# Patient Record
Sex: FEMALE | Race: WHITE | NOT HISPANIC OR LATINO | URBAN - METROPOLITAN AREA
[De-identification: names, ages, dates, MRNs, and addresses within clinical notes are randomized per-mention and may not be internally consistent; named-entity substitution may affect disease eponyms.]

---

## 2019-05-30 ENCOUNTER — OUTPATIENT (OUTPATIENT)
Dept: OUTPATIENT SERVICES | Facility: HOSPITAL | Age: 34
LOS: 1 days | End: 2019-05-30
Payer: COMMERCIAL

## 2019-05-30 VITALS
HEIGHT: 64 IN | SYSTOLIC BLOOD PRESSURE: 125 MMHG | HEART RATE: 67 BPM | OXYGEN SATURATION: 100 % | TEMPERATURE: 99 F | RESPIRATION RATE: 15 BRPM | DIASTOLIC BLOOD PRESSURE: 75 MMHG | WEIGHT: 145.95 LBS

## 2019-05-30 DIAGNOSIS — O26.872 CERVICAL SHORTENING, SECOND TRIMESTER: ICD-10-CM

## 2019-05-30 DIAGNOSIS — Z98.890 OTHER SPECIFIED POSTPROCEDURAL STATES: Chronic | ICD-10-CM

## 2019-05-30 DIAGNOSIS — Z01.818 ENCOUNTER FOR OTHER PREPROCEDURAL EXAMINATION: ICD-10-CM

## 2019-05-30 LAB
HCT VFR BLD CALC: 36.8 % — SIGNIFICANT CHANGE UP (ref 34.5–45)
HGB BLD-MCNC: 12.9 G/DL — SIGNIFICANT CHANGE UP (ref 11.5–15.5)
MCHC RBC-ENTMCNC: 31.5 PG — SIGNIFICANT CHANGE UP (ref 27–34)
MCHC RBC-ENTMCNC: 35.1 GM/DL — SIGNIFICANT CHANGE UP (ref 32–36)
MCV RBC AUTO: 89.8 FL — SIGNIFICANT CHANGE UP (ref 80–100)
PLATELET # BLD AUTO: 223 K/UL — SIGNIFICANT CHANGE UP (ref 150–400)
RBC # BLD: 4.1 M/UL — SIGNIFICANT CHANGE UP (ref 3.8–5.2)
RBC # FLD: 12.3 % — SIGNIFICANT CHANGE UP (ref 10.3–14.5)
WBC # BLD: 9.23 K/UL — SIGNIFICANT CHANGE UP (ref 3.8–10.5)
WBC # FLD AUTO: 9.23 K/UL — SIGNIFICANT CHANGE UP (ref 3.8–10.5)

## 2019-05-30 PROCEDURE — 85027 COMPLETE CBC AUTOMATED: CPT

## 2019-05-30 PROCEDURE — G0463: CPT

## 2019-05-30 RX ORDER — LIDOCAINE HCL 20 MG/ML
0.2 VIAL (ML) INJECTION ONCE
Refills: 0 | Status: DISCONTINUED | OUTPATIENT
Start: 2019-06-06 | End: 2019-06-21

## 2019-05-30 RX ORDER — SODIUM CHLORIDE 9 MG/ML
3 INJECTION INTRAMUSCULAR; INTRAVENOUS; SUBCUTANEOUS EVERY 8 HOURS
Refills: 0 | Status: DISCONTINUED | OUTPATIENT
Start: 2019-06-06 | End: 2019-06-21

## 2019-05-30 NOTE — H&P PST ADULT - NSICDXPROBLEM_GEN_ALL_CORE_FT
PROBLEM DIAGNOSES  Problem: Cervical shortening in second trimester  Assessment and Plan: Cervical cerclage , PSt instruction given , CBC drawn sent pending result

## 2019-05-30 NOTE — H&P PST ADULT - HISTORY OF PRESENT ILLNESS
34 year old female 13 weeks pregnant came in for PSt today for cervical cerclage . Patient had previous hx of incompetent cervix from previous pregnancy , doing this for prophylaxis procedure.

## 2019-05-30 NOTE — H&P PST ADULT - NSANTHOSAYNRD_GEN_A_CORE
No. THAI screening performed.  STOP BANG Legend: 0-2 = LOW Risk; 3-4 = INTERMEDIATE Risk; 5-8 = HIGH Risk

## 2019-06-05 ENCOUNTER — TRANSCRIPTION ENCOUNTER (OUTPATIENT)
Age: 34
End: 2019-06-05

## 2019-06-06 ENCOUNTER — OUTPATIENT (OUTPATIENT)
Dept: OUTPATIENT SERVICES | Facility: HOSPITAL | Age: 34
LOS: 1 days | End: 2019-06-06
Payer: COMMERCIAL

## 2019-06-06 VITALS
TEMPERATURE: 98 F | HEART RATE: 87 BPM | SYSTOLIC BLOOD PRESSURE: 104 MMHG | DIASTOLIC BLOOD PRESSURE: 68 MMHG | HEIGHT: 64 IN | OXYGEN SATURATION: 98 % | RESPIRATION RATE: 16 BRPM | WEIGHT: 145.95 LBS

## 2019-06-06 VITALS
SYSTOLIC BLOOD PRESSURE: 116 MMHG | HEART RATE: 82 BPM | OXYGEN SATURATION: 98 % | DIASTOLIC BLOOD PRESSURE: 62 MMHG | TEMPERATURE: 97 F | RESPIRATION RATE: 18 BRPM

## 2019-06-06 DIAGNOSIS — O26.872 CERVICAL SHORTENING, SECOND TRIMESTER: ICD-10-CM

## 2019-06-06 DIAGNOSIS — Z98.890 OTHER SPECIFIED POSTPROCEDURAL STATES: Chronic | ICD-10-CM

## 2019-06-06 PROCEDURE — 59320 REVISION OF CERVIX: CPT

## 2019-06-06 RX ORDER — ACETAMINOPHEN 500 MG
1000 TABLET ORAL ONCE
Refills: 0 | Status: COMPLETED | OUTPATIENT
Start: 2019-06-06 | End: 2019-06-06

## 2019-06-06 RX ORDER — SODIUM CHLORIDE 9 MG/ML
1000 INJECTION, SOLUTION INTRAVENOUS
Refills: 0 | Status: DISCONTINUED | OUTPATIENT
Start: 2019-06-06 | End: 2019-06-21

## 2019-06-06 RX ORDER — ONDANSETRON 8 MG/1
4 TABLET, FILM COATED ORAL ONCE
Refills: 0 | Status: DISCONTINUED | OUTPATIENT
Start: 2019-06-06 | End: 2019-06-21

## 2019-06-06 RX ADMIN — Medication 400 MILLIGRAM(S): at 08:15

## 2019-06-06 NOTE — ASU DISCHARGE PLAN (ADULT/PEDIATRIC) - CARE PROVIDER_API CALL
Aurelio Mercer)  Obstetrics and Gynecology  [address] [place] [id number]  Phone: [phone]  Fax: [phone]  Follow Up Time:

## 2019-06-06 NOTE — BRIEF OPERATIVE NOTE - NSICDXBRIEFPOSTOP_GEN_ALL_CORE_FT
POST-OP DIAGNOSIS:  Incompetent cervix in pregnancy, second trimester 06-Jun-2019 07:44:23  Linda Vasquez

## 2019-06-06 NOTE — BRIEF OPERATIVE NOTE - NSICDXBRIEFPREOP_GEN_ALL_CORE_FT
PRE-OP DIAGNOSIS:  Incompetent cervix in pregnancy, second trimester 06-Jun-2019 07:44:03  Linda Vasquez

## 2019-06-25 PROBLEM — Z78.9 OTHER SPECIFIED HEALTH STATUS: Chronic | Status: ACTIVE | Noted: 2019-05-30

## 2019-06-25 PROBLEM — Z00.00 ENCOUNTER FOR PREVENTIVE HEALTH EXAMINATION: Status: ACTIVE | Noted: 2019-06-25

## 2019-07-18 ENCOUNTER — ASOB RESULT (OUTPATIENT)
Age: 34
End: 2019-07-18

## 2019-07-18 ENCOUNTER — APPOINTMENT (OUTPATIENT)
Dept: ANTEPARTUM | Facility: CLINIC | Age: 34
End: 2019-07-18
Payer: COMMERCIAL

## 2019-07-18 PROCEDURE — 76811 OB US DETAILED SNGL FETUS: CPT

## 2019-07-18 PROCEDURE — 76817 TRANSVAGINAL US OBSTETRIC: CPT

## 2019-08-27 ENCOUNTER — ASOB RESULT (OUTPATIENT)
Age: 34
End: 2019-08-27

## 2019-08-27 ENCOUNTER — APPOINTMENT (OUTPATIENT)
Dept: MATERNAL FETAL MEDICINE | Facility: CLINIC | Age: 34
End: 2019-08-27
Payer: COMMERCIAL

## 2019-08-27 DIAGNOSIS — O99.810 ABNORMAL GLUCOSE COMPLICATING PREGNANCY: ICD-10-CM

## 2019-08-27 PROCEDURE — G0109 DIAB MANAGE TRN IND/GROUP: CPT

## 2019-08-28 ENCOUNTER — TRANSCRIPTION ENCOUNTER (OUTPATIENT)
Age: 34
End: 2019-08-28

## 2019-08-28 RX ORDER — BLOOD-GLUCOSE METER
KIT MISCELLANEOUS
Qty: 1 | Refills: 5 | Status: ACTIVE | COMMUNITY
Start: 2019-08-27 | End: 1900-01-01

## 2019-08-28 RX ORDER — LANCETS 33 GAUGE
EACH MISCELLANEOUS
Qty: 1 | Refills: 2 | Status: ACTIVE | COMMUNITY
Start: 2019-08-27 | End: 1900-01-01

## 2019-08-28 RX ORDER — BLOOD-GLUCOSE METER
W/DEVICE KIT MISCELLANEOUS
Qty: 1 | Refills: 0 | Status: ACTIVE | COMMUNITY
Start: 2019-08-27 | End: 1900-01-01

## 2019-08-28 RX ORDER — URINE ACETONE TEST STRIPS
STRIP MISCELLANEOUS
Qty: 1 | Refills: 1 | Status: ACTIVE | COMMUNITY
Start: 2019-08-27 | End: 1900-01-01

## 2019-09-04 ENCOUNTER — APPOINTMENT (OUTPATIENT)
Dept: MATERNAL FETAL MEDICINE | Facility: CLINIC | Age: 34
End: 2019-09-04
Payer: COMMERCIAL

## 2019-09-04 PROCEDURE — G0109 DIAB MANAGE TRN IND/GROUP: CPT

## 2019-09-10 ENCOUNTER — APPOINTMENT (OUTPATIENT)
Dept: MATERNAL FETAL MEDICINE | Facility: CLINIC | Age: 34
End: 2019-09-10
Payer: COMMERCIAL

## 2019-09-10 PROCEDURE — G0108 DIAB MANAGE TRN  PER INDIV: CPT

## 2019-09-24 ENCOUNTER — APPOINTMENT (OUTPATIENT)
Dept: MATERNAL FETAL MEDICINE | Facility: CLINIC | Age: 34
End: 2019-09-24
Payer: COMMERCIAL

## 2019-09-24 ENCOUNTER — ASOB RESULT (OUTPATIENT)
Age: 34
End: 2019-09-24

## 2019-09-24 PROCEDURE — G0108 DIAB MANAGE TRN  PER INDIV: CPT

## 2019-10-03 ENCOUNTER — ASOB RESULT (OUTPATIENT)
Age: 34
End: 2019-10-03

## 2019-10-03 ENCOUNTER — APPOINTMENT (OUTPATIENT)
Dept: MATERNAL FETAL MEDICINE | Facility: CLINIC | Age: 34
End: 2019-10-03
Payer: COMMERCIAL

## 2019-10-03 PROCEDURE — G0108 DIAB MANAGE TRN  PER INDIV: CPT

## 2019-10-03 RX ORDER — HUMAN INSULIN 100 [IU]/ML
100 INJECTION, SUSPENSION SUBCUTANEOUS AT BEDTIME
Qty: 1 | Refills: 2 | Status: ACTIVE | COMMUNITY
Start: 2019-10-03 | End: 1900-01-01

## 2019-10-10 ENCOUNTER — ASOB RESULT (OUTPATIENT)
Age: 34
End: 2019-10-10

## 2019-10-10 ENCOUNTER — APPOINTMENT (OUTPATIENT)
Dept: MATERNAL FETAL MEDICINE | Facility: CLINIC | Age: 34
End: 2019-10-10
Payer: COMMERCIAL

## 2019-10-10 PROCEDURE — G0108 DIAB MANAGE TRN  PER INDIV: CPT

## 2019-10-10 RX ORDER — PEN NEEDLE, DIABETIC 29 G X1/2"
32G X 4 MM NEEDLE, DISPOSABLE MISCELLANEOUS
Qty: 1 | Refills: 1 | Status: ACTIVE | COMMUNITY
Start: 2019-10-10 | End: 1900-01-01

## 2019-10-10 RX ORDER — INSULIN HUMAN 100 [IU]/ML
100 INJECTION, SUSPENSION SUBCUTANEOUS
Qty: 1 | Refills: 2 | Status: ACTIVE | COMMUNITY
Start: 2019-10-10 | End: 1900-01-01

## 2019-10-15 ENCOUNTER — MESSAGE (OUTPATIENT)
Age: 34
End: 2019-10-15

## 2019-10-16 ENCOUNTER — MESSAGE (OUTPATIENT)
Age: 34
End: 2019-10-16

## 2019-10-18 ENCOUNTER — RX RENEWAL (OUTPATIENT)
Age: 34
End: 2019-10-18

## 2019-10-21 RX ORDER — PEN NEEDLE, DIABETIC 29 G X1/2"
32G X 4 MM NEEDLE, DISPOSABLE MISCELLANEOUS
Qty: 1 | Refills: 1 | Status: ACTIVE | COMMUNITY
Start: 2019-10-03 | End: 1900-01-01

## 2019-10-24 ENCOUNTER — ASOB RESULT (OUTPATIENT)
Age: 34
End: 2019-10-24

## 2019-10-24 ENCOUNTER — APPOINTMENT (OUTPATIENT)
Dept: MATERNAL FETAL MEDICINE | Facility: CLINIC | Age: 34
End: 2019-10-24
Payer: COMMERCIAL

## 2019-10-24 PROCEDURE — G0108 DIAB MANAGE TRN  PER INDIV: CPT

## 2019-11-13 ENCOUNTER — RX RENEWAL (OUTPATIENT)
Age: 34
End: 2019-11-13

## 2019-11-13 RX ORDER — SYRING-NEEDL,DISP,INSUL,0.3 ML 31 GX5/16"
31G X 5/16" SYRINGE, EMPTY DISPOSABLE MISCELLANEOUS
Qty: 1 | Refills: 1 | Status: ACTIVE | COMMUNITY
Start: 2019-11-13 | End: 1900-01-01

## 2019-11-15 ENCOUNTER — MESSAGE (OUTPATIENT)
Age: 34
End: 2019-11-15

## 2019-11-15 ENCOUNTER — APPOINTMENT (OUTPATIENT)
Dept: MATERNAL FETAL MEDICINE | Facility: CLINIC | Age: 34
End: 2019-11-15

## 2019-11-21 ENCOUNTER — ASOB RESULT (OUTPATIENT)
Age: 34
End: 2019-11-21

## 2019-11-21 ENCOUNTER — APPOINTMENT (OUTPATIENT)
Dept: MATERNAL FETAL MEDICINE | Facility: CLINIC | Age: 34
End: 2019-11-21
Payer: COMMERCIAL

## 2019-11-21 PROCEDURE — G0108 DIAB MANAGE TRN  PER INDIV: CPT

## 2019-12-02 ENCOUNTER — INPATIENT (INPATIENT)
Facility: HOSPITAL | Age: 34
LOS: 1 days | Discharge: HOME | End: 2019-12-04
Attending: OBSTETRICS & GYNECOLOGY | Admitting: OBSTETRICS & GYNECOLOGY
Payer: COMMERCIAL

## 2019-12-02 VITALS
RESPIRATION RATE: 18 BRPM | DIASTOLIC BLOOD PRESSURE: 57 MMHG | HEART RATE: 78 BPM | TEMPERATURE: 99 F | SYSTOLIC BLOOD PRESSURE: 109 MMHG | WEIGHT: 175.05 LBS | OXYGEN SATURATION: 98 %

## 2019-12-02 DIAGNOSIS — Z3A.00 WEEKS OF GESTATION OF PREGNANCY NOT SPECIFIED: ICD-10-CM

## 2019-12-02 DIAGNOSIS — O26.899 OTHER SPECIFIED PREGNANCY RELATED CONDITIONS, UNSPECIFIED TRIMESTER: ICD-10-CM

## 2019-12-02 DIAGNOSIS — Z34.80 ENCOUNTER FOR SUPERVISION OF OTHER NORMAL PREGNANCY, UNSPECIFIED TRIMESTER: ICD-10-CM

## 2019-12-02 DIAGNOSIS — Z98.890 OTHER SPECIFIED POSTPROCEDURAL STATES: Chronic | ICD-10-CM

## 2019-12-02 LAB
BASOPHILS # BLD AUTO: 0.03 K/UL — SIGNIFICANT CHANGE UP (ref 0–0.2)
BASOPHILS NFR BLD AUTO: 0.3 % — SIGNIFICANT CHANGE UP (ref 0–2)
BLD GP AB SCN SERPL QL: NEGATIVE — SIGNIFICANT CHANGE UP
EOSINOPHIL # BLD AUTO: 0.1 K/UL — SIGNIFICANT CHANGE UP (ref 0–0.5)
EOSINOPHIL NFR BLD AUTO: 1 % — SIGNIFICANT CHANGE UP (ref 0–6)
GLUCOSE BLDC GLUCOMTR-MCNC: 107 MG/DL — HIGH (ref 70–99)
HCT VFR BLD CALC: 33.5 % — LOW (ref 34.5–45)
HGB BLD-MCNC: 11.6 G/DL — SIGNIFICANT CHANGE UP (ref 11.5–15.5)
IMM GRANULOCYTES NFR BLD AUTO: 0.5 % — SIGNIFICANT CHANGE UP (ref 0–1.5)
LYMPHOCYTES # BLD AUTO: 2.36 K/UL — SIGNIFICANT CHANGE UP (ref 1–3.3)
LYMPHOCYTES # BLD AUTO: 22.5 % — SIGNIFICANT CHANGE UP (ref 13–44)
MCHC RBC-ENTMCNC: 31.2 PG — SIGNIFICANT CHANGE UP (ref 27–34)
MCHC RBC-ENTMCNC: 34.6 GM/DL — SIGNIFICANT CHANGE UP (ref 32–36)
MCV RBC AUTO: 90.1 FL — SIGNIFICANT CHANGE UP (ref 80–100)
MONOCYTES # BLD AUTO: 0.78 K/UL — SIGNIFICANT CHANGE UP (ref 0–0.9)
MONOCYTES NFR BLD AUTO: 7.4 % — SIGNIFICANT CHANGE UP (ref 2–14)
NEUTROPHILS # BLD AUTO: 7.19 K/UL — SIGNIFICANT CHANGE UP (ref 1.8–7.4)
NEUTROPHILS NFR BLD AUTO: 68.3 % — SIGNIFICANT CHANGE UP (ref 43–77)
NRBC # BLD: 0 /100 WBCS — SIGNIFICANT CHANGE UP (ref 0–0)
PLATELET # BLD AUTO: 184 K/UL — SIGNIFICANT CHANGE UP (ref 150–400)
RBC # BLD: 3.72 M/UL — LOW (ref 3.8–5.2)
RBC # FLD: 12.7 % — SIGNIFICANT CHANGE UP (ref 10.3–14.5)
RH IG SCN BLD-IMP: NEGATIVE — SIGNIFICANT CHANGE UP
RH IG SCN BLD-IMP: NEGATIVE — SIGNIFICANT CHANGE UP
T PALLIDUM AB TITR SER: NEGATIVE — SIGNIFICANT CHANGE UP
WBC # BLD: 10.51 K/UL — HIGH (ref 3.8–10.5)
WBC # FLD AUTO: 10.51 K/UL — HIGH (ref 3.8–10.5)

## 2019-12-02 RX ORDER — DIPHENHYDRAMINE HCL 50 MG
25 CAPSULE ORAL EVERY 6 HOURS
Refills: 0 | Status: DISCONTINUED | OUTPATIENT
Start: 2019-12-02 | End: 2019-12-04

## 2019-12-02 RX ORDER — TETANUS TOXOID, REDUCED DIPHTHERIA TOXOID AND ACELLULAR PERTUSSIS VACCINE, ADSORBED 5; 2.5; 8; 8; 2.5 [IU]/.5ML; [IU]/.5ML; UG/.5ML; UG/.5ML; UG/.5ML
0.5 SUSPENSION INTRAMUSCULAR ONCE
Refills: 0 | Status: DISCONTINUED | OUTPATIENT
Start: 2019-12-02 | End: 2019-12-04

## 2019-12-02 RX ORDER — OXYCODONE HYDROCHLORIDE 5 MG/1
5 TABLET ORAL
Refills: 0 | Status: DISCONTINUED | OUTPATIENT
Start: 2019-12-02 | End: 2019-12-04

## 2019-12-02 RX ORDER — AMPICILLIN TRIHYDRATE 250 MG
1 CAPSULE ORAL EVERY 4 HOURS
Refills: 0 | Status: DISCONTINUED | OUTPATIENT
Start: 2019-12-02 | End: 2019-12-02

## 2019-12-02 RX ORDER — IBUPROFEN 200 MG
600 TABLET ORAL EVERY 6 HOURS
Refills: 0 | Status: DISCONTINUED | OUTPATIENT
Start: 2019-12-02 | End: 2019-12-04

## 2019-12-02 RX ORDER — CITRIC ACID/SODIUM CITRATE 300-500 MG
15 SOLUTION, ORAL ORAL EVERY 6 HOURS
Refills: 0 | Status: DISCONTINUED | OUTPATIENT
Start: 2019-12-02 | End: 2019-12-02

## 2019-12-02 RX ORDER — OXYTOCIN 10 UNIT/ML
333.33 VIAL (ML) INJECTION
Qty: 20 | Refills: 0 | Status: DISCONTINUED | OUTPATIENT
Start: 2019-12-02 | End: 2019-12-02

## 2019-12-02 RX ORDER — SODIUM CHLORIDE 9 MG/ML
3 INJECTION INTRAMUSCULAR; INTRAVENOUS; SUBCUTANEOUS EVERY 8 HOURS
Refills: 0 | Status: DISCONTINUED | OUTPATIENT
Start: 2019-12-02 | End: 2019-12-04

## 2019-12-02 RX ORDER — AMPICILLIN TRIHYDRATE 250 MG
2 CAPSULE ORAL ONCE
Refills: 0 | Status: DISCONTINUED | OUTPATIENT
Start: 2019-12-02 | End: 2019-12-02

## 2019-12-02 RX ORDER — MAGNESIUM HYDROXIDE 400 MG/1
30 TABLET, CHEWABLE ORAL
Refills: 0 | Status: DISCONTINUED | OUTPATIENT
Start: 2019-12-02 | End: 2019-12-04

## 2019-12-02 RX ORDER — PRAMOXINE HYDROCHLORIDE 150 MG/15G
1 AEROSOL, FOAM RECTAL EVERY 4 HOURS
Refills: 0 | Status: DISCONTINUED | OUTPATIENT
Start: 2019-12-02 | End: 2019-12-04

## 2019-12-02 RX ORDER — KETOROLAC TROMETHAMINE 30 MG/ML
30 SYRINGE (ML) INJECTION ONCE
Refills: 0 | Status: DISCONTINUED | OUTPATIENT
Start: 2019-12-02 | End: 2019-12-02

## 2019-12-02 RX ORDER — OXYTOCIN 10 UNIT/ML
333.33 VIAL (ML) INJECTION
Qty: 20 | Refills: 0 | Status: DISCONTINUED | OUTPATIENT
Start: 2019-12-02 | End: 2019-12-04

## 2019-12-02 RX ORDER — HYDROCORTISONE 1 %
1 OINTMENT (GRAM) TOPICAL EVERY 6 HOURS
Refills: 0 | Status: DISCONTINUED | OUTPATIENT
Start: 2019-12-02 | End: 2019-12-04

## 2019-12-02 RX ORDER — AER TRAVELER 0.5 G/1
1 SOLUTION RECTAL; TOPICAL EVERY 4 HOURS
Refills: 0 | Status: DISCONTINUED | OUTPATIENT
Start: 2019-12-02 | End: 2019-12-04

## 2019-12-02 RX ORDER — SODIUM CHLORIDE 9 MG/ML
1000 INJECTION INTRAMUSCULAR; INTRAVENOUS; SUBCUTANEOUS
Refills: 0 | Status: COMPLETED | OUTPATIENT
Start: 2019-12-02 | End: 2019-12-02

## 2019-12-02 RX ORDER — GLYCERIN ADULT
1 SUPPOSITORY, RECTAL RECTAL AT BEDTIME
Refills: 0 | Status: DISCONTINUED | OUTPATIENT
Start: 2019-12-02 | End: 2019-12-04

## 2019-12-02 RX ORDER — ACETAMINOPHEN 500 MG
975 TABLET ORAL
Refills: 0 | Status: DISCONTINUED | OUTPATIENT
Start: 2019-12-02 | End: 2019-12-04

## 2019-12-02 RX ORDER — SIMETHICONE 80 MG/1
80 TABLET, CHEWABLE ORAL EVERY 4 HOURS
Refills: 0 | Status: DISCONTINUED | OUTPATIENT
Start: 2019-12-02 | End: 2019-12-04

## 2019-12-02 RX ORDER — OXYTOCIN 10 UNIT/ML
4 VIAL (ML) INJECTION
Qty: 30 | Refills: 0 | Status: DISCONTINUED | OUTPATIENT
Start: 2019-12-02 | End: 2019-12-02

## 2019-12-02 RX ORDER — BENZOCAINE 10 %
1 GEL (GRAM) MUCOUS MEMBRANE EVERY 6 HOURS
Refills: 0 | Status: DISCONTINUED | OUTPATIENT
Start: 2019-12-02 | End: 2019-12-04

## 2019-12-02 RX ORDER — OXYCODONE HYDROCHLORIDE 5 MG/1
5 TABLET ORAL ONCE
Refills: 0 | Status: DISCONTINUED | OUTPATIENT
Start: 2019-12-02 | End: 2019-12-04

## 2019-12-02 RX ORDER — DIBUCAINE 1 %
1 OINTMENT (GRAM) RECTAL EVERY 6 HOURS
Refills: 0 | Status: DISCONTINUED | OUTPATIENT
Start: 2019-12-02 | End: 2019-12-04

## 2019-12-02 RX ORDER — SODIUM CHLORIDE 9 MG/ML
1000 INJECTION, SOLUTION INTRAVENOUS
Refills: 0 | Status: DISCONTINUED | OUTPATIENT
Start: 2019-12-02 | End: 2019-12-02

## 2019-12-02 RX ORDER — LANOLIN
1 OINTMENT (GRAM) TOPICAL EVERY 6 HOURS
Refills: 0 | Status: DISCONTINUED | OUTPATIENT
Start: 2019-12-02 | End: 2019-12-04

## 2019-12-02 RX ORDER — IBUPROFEN 200 MG
600 TABLET ORAL EVERY 6 HOURS
Refills: 0 | Status: COMPLETED | OUTPATIENT
Start: 2019-12-02 | End: 2020-10-30

## 2019-12-02 RX ORDER — SODIUM CHLORIDE 9 MG/ML
1000 INJECTION, SOLUTION INTRAVENOUS
Refills: 0 | Status: COMPLETED | OUTPATIENT
Start: 2019-12-02 | End: 2019-12-02

## 2019-12-02 RX ADMIN — Medication 1 TABLET(S): at 12:28

## 2019-12-02 RX ADMIN — Medication 600 MILLIGRAM(S): at 17:36

## 2019-12-02 RX ADMIN — SODIUM CHLORIDE 125 MILLILITER(S): 9 INJECTION, SOLUTION INTRAVENOUS at 06:14

## 2019-12-02 RX ADMIN — Medication 975 MILLIGRAM(S): at 14:01

## 2019-12-02 RX ADMIN — SODIUM CHLORIDE 125 MILLILITER(S): 9 INJECTION INTRAMUSCULAR; INTRAVENOUS; SUBCUTANEOUS at 06:14

## 2019-12-02 RX ADMIN — Medication 30 MILLIGRAM(S): at 09:24

## 2019-12-02 RX ADMIN — Medication 975 MILLIGRAM(S): at 21:45

## 2019-12-02 RX ADMIN — Medication 4 MILLIUNIT(S)/MIN: at 06:20

## 2019-12-02 RX ADMIN — Medication 975 MILLIGRAM(S): at 20:58

## 2019-12-02 RX ADMIN — Medication 1000 MILLIUNIT(S)/MIN: at 09:43

## 2019-12-02 NOTE — PRE-ANESTHESIA EVALUATION ADULT - NSANTHADDINFOFT_GEN_ALL_CORE
labor epidural explained in detail; risks/alternatives including but not limited to HA, failure, nv injury.  All questions answered.

## 2019-12-03 LAB
HCT VFR BLD CALC: 34 % — LOW (ref 34.5–45)
HGB BLD-MCNC: 11.2 G/DL — LOW (ref 11.5–15.5)

## 2019-12-03 RX ADMIN — Medication 600 MILLIGRAM(S): at 11:18

## 2019-12-03 RX ADMIN — Medication 1 TABLET(S): at 11:18

## 2019-12-03 RX ADMIN — Medication 975 MILLIGRAM(S): at 09:27

## 2019-12-03 RX ADMIN — Medication 975 MILLIGRAM(S): at 15:08

## 2019-12-03 RX ADMIN — Medication 975 MILLIGRAM(S): at 19:45

## 2019-12-03 RX ADMIN — Medication 975 MILLIGRAM(S): at 03:20

## 2019-12-03 RX ADMIN — Medication 600 MILLIGRAM(S): at 06:10

## 2019-12-03 RX ADMIN — Medication 600 MILLIGRAM(S): at 12:26

## 2019-12-03 RX ADMIN — Medication 600 MILLIGRAM(S): at 00:45

## 2019-12-03 RX ADMIN — Medication 600 MILLIGRAM(S): at 00:00

## 2019-12-03 RX ADMIN — Medication 600 MILLIGRAM(S): at 23:45

## 2019-12-03 RX ADMIN — Medication 975 MILLIGRAM(S): at 08:40

## 2019-12-03 RX ADMIN — Medication 600 MILLIGRAM(S): at 23:05

## 2019-12-03 RX ADMIN — Medication 600 MILLIGRAM(S): at 17:22

## 2019-12-03 RX ADMIN — Medication 975 MILLIGRAM(S): at 20:30

## 2019-12-03 RX ADMIN — Medication 600 MILLIGRAM(S): at 17:50

## 2019-12-03 RX ADMIN — Medication 975 MILLIGRAM(S): at 03:55

## 2019-12-03 RX ADMIN — Medication 975 MILLIGRAM(S): at 14:00

## 2019-12-03 RX ADMIN — Medication 600 MILLIGRAM(S): at 05:35

## 2019-12-03 NOTE — PROGRESS NOTE ADULT - ASSESSMENT
A/P:  34y  PPD # 1   S/P     with 2nd degree laceration     Doing Well    PMHx: GDM  Current Issues: RH neg, s/p OR Cytotec A/P:  34y  PPD # 1   S/P     with 2nd degree laceration     Doing Well    PMHx: GDM  Current Issues: RH neg, Baby B neg, no Rhogam needed, s/p RI Cytotec

## 2019-12-03 NOTE — DIETITIAN INITIAL EVALUATION ADULT. - ENERGY NEEDS
ht: 64 inches. pre-pregnancy wt: BMI:  UBW: 25.2 kG/m2. IBW: 120 pounds +/- 10%. %IBW: ht: 64 inches. pre-pregnancy wt: 147 pounds. pregnancy wt gain: 16 pounds. pre-pregnancy BMI: 25.2 kG/m2. UBW: 25.2 kG/m2. IBW: 120 pounds +/- 10%. %IBW: 123%

## 2019-12-03 NOTE — PROGRESS NOTE ADULT - SUBJECTIVE AND OBJECTIVE BOX
Postpartum Note- PPD#1    Prenatal Labs  Blood type: B Negative  Rubella IgG: Imm  RPR: Negative        S:Patient w/o complaints, pain is controlled.    Pt is OOB, tolerating PO, voiding. Lochia WNL.     O:  Vital Signs Last 24 Hrs  T(C): 36.6 (03 Dec 2019 05:00), Max: 37.1 (02 Dec 2019 07:20)  T(F): 97.9 (03 Dec 2019 05:00), Max: 98.8 (02 Dec 2019 08:20)  HR: 84 (03 Dec 2019 05:00) (67 - 96)  BP: 94/60 (03 Dec 2019 05:00) (91/55 - 130/77)  BP(mean): --  RR: 18 (03 Dec 2019 05:00) (17 - 20)  SpO2: 96% (03 Dec 2019 05:00) (94% - 99%)     Gen: NAD  Abdomen: Soft, nontender, non-distended, fundus firm.  Vaginal: Lochia WNL,    Ext:  Neg calf tenderness    LABS:    Hemoglobin: 11.6 g/dL (12-02 @ 05:04)      Hematocrit: 33.5 % (12-02 @ 05:04)

## 2019-12-03 NOTE — DIETITIAN INITIAL EVALUATION ADULT. - OTHER INFO
Pt is a 34 year old  PPD # 1 S/P , antepartum course significant for GDMA2 (Insulin). Pt reports hx of GDM in prior pregnancy. She plans on exclusively breastfeeding infant.

## 2019-12-03 NOTE — DIETITIAN INITIAL EVALUATION ADULT. - ADD RECOMMEND
Post-partum GDM diet education reviewed: 1) Increased risk of developing T2DM with GDM and ways to help prevent development. 2) 4-12 week fasting oral glucose tolerance test follow-up as outpatient 3) General healthful diet and physical activity recommendations discussed 4) Pre-conception counseling/planning for future pregnancies and risks associated w/high glucose in the pre-conception period. 5) Increased energy needs with breastfeeding. After-delivery GDM education handout provided.

## 2019-12-04 ENCOUNTER — TRANSCRIPTION ENCOUNTER (OUTPATIENT)
Age: 34
End: 2019-12-04

## 2019-12-04 VITALS
OXYGEN SATURATION: 96 % | SYSTOLIC BLOOD PRESSURE: 101 MMHG | HEART RATE: 82 BPM | TEMPERATURE: 98 F | RESPIRATION RATE: 18 BRPM | DIASTOLIC BLOOD PRESSURE: 61 MMHG

## 2019-12-04 PROCEDURE — 59050 FETAL MONITOR W/REPORT: CPT

## 2019-12-04 PROCEDURE — 85018 HEMOGLOBIN: CPT

## 2019-12-04 PROCEDURE — 86850 RBC ANTIBODY SCREEN: CPT

## 2019-12-04 PROCEDURE — 86900 BLOOD TYPING SEROLOGIC ABO: CPT

## 2019-12-04 PROCEDURE — 85027 COMPLETE CBC AUTOMATED: CPT

## 2019-12-04 PROCEDURE — 59025 FETAL NON-STRESS TEST: CPT

## 2019-12-04 PROCEDURE — G0463: CPT

## 2019-12-04 PROCEDURE — 85014 HEMATOCRIT: CPT

## 2019-12-04 PROCEDURE — 82962 GLUCOSE BLOOD TEST: CPT

## 2019-12-04 PROCEDURE — 86901 BLOOD TYPING SEROLOGIC RH(D): CPT

## 2019-12-04 PROCEDURE — 86780 TREPONEMA PALLIDUM: CPT

## 2019-12-04 RX ADMIN — Medication 975 MILLIGRAM(S): at 02:55

## 2019-12-04 RX ADMIN — Medication 600 MILLIGRAM(S): at 11:20

## 2019-12-04 RX ADMIN — Medication 600 MILLIGRAM(S): at 05:40

## 2019-12-04 RX ADMIN — Medication 600 MILLIGRAM(S): at 06:22

## 2019-12-04 RX ADMIN — Medication 975 MILLIGRAM(S): at 07:34

## 2019-12-04 RX ADMIN — Medication 975 MILLIGRAM(S): at 02:10

## 2019-12-04 RX ADMIN — Medication 975 MILLIGRAM(S): at 08:00

## 2019-12-04 RX ADMIN — Medication 600 MILLIGRAM(S): at 12:01

## 2019-12-04 RX ADMIN — Medication 1 TABLET(S): at 11:20

## 2019-12-04 NOTE — DISCHARGE NOTE OB - PATIENT PORTAL LINK FT
You can access the FollowMyHealth Patient Portal offered by [facility] by registering at the following website: [email or web address]. By joining [employer]’s FollowMyHealth portal, you will also be able to view your health information using other applications (apps) compatible with our system.

## 2019-12-04 NOTE — DISCHARGE NOTE OB - CARE PROVIDER_API CALL
Aurelio Mercer)  Obstetrics and Gynecology  [address] [place] [id number]  Phone: [phone]  Fax: [phone]  Follow Up Time:

## 2019-12-04 NOTE — PROGRESS NOTE ADULT - SUBJECTIVE AND OBJECTIVE BOX
S: Patient doing well. No complaints. Minimal lochia. Pain controlled.    O: Vital Signs Last 24 Hrs  T(C): 36.7 (03 Dec 2019 18:00), Max: 36.7 (03 Dec 2019 18:00)  T(F): 98.1 (03 Dec 2019 18:00), Max: 98.1 (03 Dec 2019 18:00)  HR: 76 (03 Dec 2019 18:00) (76 - 76)  BP: 97/65 (03 Dec 2019 18:00) (97/65 - 97/65)  BP(mean): --  RR: 18 (03 Dec 2019 18:00) (18 - 18)  SpO2: 97% (03 Dec 2019 18:00) (97% - 97%)    Gen: NAD  Abd: soft, Nontender, Nondistended, fundus firm  Ext: no tenderness, mild edema    Labs:                        11.2   x     )-----------( x        ( 03 Dec 2019 08:54 )             34.0       A: 34y PPD#2 s/p  doing well.    Plan:  Routine postpartum care  Encouraged out of bed  Regular diet    Discharge  JOSE Pascual MD

## 2020-08-27 NOTE — PATIENT PROFILE OB - AMNIOTIC FLUID COLOR, LABOR
I have reviewed the triage vital signs. Const: Well-nourished, Well-developed, GI: Nontender/Nondistended soft abdomen, no CVA tenderness   : no scrotal swelling, no tenderness, no erythema in the gu region  MSK: Extremities w/o deformity or ttp, bilateral varicose veins visualized, 2+ DP and PT pulse,   Neuro: 5/5 bilateral hip flexion/extension, 5/5 bilateral knee flexion/extension, 5/5 bilateral dorsiflexion/plantarflexion, intact sensation in the bilateral legs to light touch,  Psych: Awake, Alert, & Orientedx3;  Appropriate mood and affect, cooperative clear

## 2021-12-28 ENCOUNTER — APPOINTMENT (OUTPATIENT)
Dept: MAMMOGRAPHY | Facility: CLINIC | Age: 36
End: 2021-12-28
Payer: COMMERCIAL

## 2021-12-28 PROCEDURE — 77063 BREAST TOMOSYNTHESIS BI: CPT

## 2021-12-28 PROCEDURE — 77067 SCR MAMMO BI INCL CAD: CPT

## 2023-03-24 ENCOUNTER — APPOINTMENT (OUTPATIENT)
Dept: MAMMOGRAPHY | Facility: CLINIC | Age: 38
End: 2023-03-24

## 2023-04-04 ENCOUNTER — APPOINTMENT (OUTPATIENT)
Dept: MAMMOGRAPHY | Facility: CLINIC | Age: 38
End: 2023-04-04
Payer: COMMERCIAL

## 2023-04-04 ENCOUNTER — APPOINTMENT (OUTPATIENT)
Dept: ULTRASOUND IMAGING | Facility: CLINIC | Age: 38
End: 2023-04-04
Payer: COMMERCIAL

## 2023-04-04 PROCEDURE — 77063 BREAST TOMOSYNTHESIS BI: CPT

## 2023-04-04 PROCEDURE — 77067 SCR MAMMO BI INCL CAD: CPT

## 2023-04-04 PROCEDURE — 76641 ULTRASOUND BREAST COMPLETE: CPT | Mod: 50

## 2023-04-13 ENCOUNTER — APPOINTMENT (OUTPATIENT)
Dept: ULTRASOUND IMAGING | Facility: IMAGING CENTER | Age: 38
End: 2023-04-13
Payer: COMMERCIAL

## 2023-04-13 ENCOUNTER — APPOINTMENT (OUTPATIENT)
Dept: MAMMOGRAPHY | Facility: IMAGING CENTER | Age: 38
End: 2023-04-13
Payer: COMMERCIAL

## 2023-04-13 ENCOUNTER — OUTPATIENT (OUTPATIENT)
Dept: OUTPATIENT SERVICES | Facility: HOSPITAL | Age: 38
LOS: 1 days | End: 2023-04-13
Payer: COMMERCIAL

## 2023-04-13 DIAGNOSIS — Z98.890 OTHER SPECIFIED POSTPROCEDURAL STATES: Chronic | ICD-10-CM

## 2023-04-13 DIAGNOSIS — Z00.8 ENCOUNTER FOR OTHER GENERAL EXAMINATION: ICD-10-CM

## 2023-04-13 PROCEDURE — G0279: CPT

## 2023-04-13 PROCEDURE — 76642 ULTRASOUND BREAST LIMITED: CPT | Mod: 26,LT

## 2023-04-13 PROCEDURE — G0279: CPT | Mod: 26

## 2023-04-13 PROCEDURE — 77065 DX MAMMO INCL CAD UNI: CPT | Mod: 26,LT

## 2023-04-13 PROCEDURE — 76642 ULTRASOUND BREAST LIMITED: CPT

## 2023-04-13 PROCEDURE — 77065 DX MAMMO INCL CAD UNI: CPT

## 2023-10-10 NOTE — DISCHARGE NOTE OB - USE THE FOOD PYRAMID (LOCATED IN DISCHARGE MATERIALS PROVIDED) AS A GUIDE TO BALANCE AND MODERATION
Physical Therapy Discharge    Visit Type: Discharge Summary -  Daily Treatment Note  Visit: 4  Referring Provider: Case Casey DO  Medical Diagnosis (from order): Diagnosis Information    Diagnosis  M21.6X1, M21.6X2 (ICD-10-CM) - Hindfoot pronation of both feet  M21.41, M21.42 (ICD-10-CM) - Pes planus of both feet  M67.979 (ICD-10-CM) - Achilles tendon disorder, unspecified laterality         SUBJECTIVE                                                                                                               Patient denies limitations with bilateral ankles. No reports of pain.   Current Functional Limitations: None per patient reports     Pain / Symptoms  - Pain rating (out of 10): Current: 0       OBJECTIVE                                                                                                                     Range of Motion (ROM)   (degrees unless noted; active unless noted; norms in ( ); negative=lacking to 0, positive=beyond 0)  Ankle:   - Dorsiflexion (20):      • Left:  7       • Right:  0    - Inversion (30-35):     • Left: 25     • Right: 25   - Eversion (15-20):     • Left: 15     • Right: 15                       Treatment     Therapeutic Exercise  Time used to record measurements and assess functional goals for discharge note  Time used to educate and review with patient on home exercise program           ASSESSMENT                                                                                                          To date the patient has made gains as expected.    Patient has completed 4 visits of physical therapy with good adherence to plan of care, and demonstrates improvements in bilateral ankle dorsiflexion active range of motion and passive range of motion decreased tightness in the bilateral gastrocnemius, improved joint mobility in bilateral talocrural joints, and improved functional mobility of the bilateral lower extremities. Patient would benefit from discharge from  physical therapy to a home exercise program in order to maintain gains and further improve on strength and mobility.      Education:   - Results of above outlined education: Verbalizes understanding and Demonstrates understanding    PLAN                                                                                                                           Discharge from skilled therapy with instructions/recommendations to: continue home exercise program    Suggestions for next session as indicated: As Of 10/10/2023 Continued prognosis to reach/maintain goals: excellent.     Patient is capable and confident continuing with home exercise program independently.          Goals  Long Term Goals: to be met by end of plan of care  1. Patient will demonstrate ability to negotiate level and unlevel surfaces at variable velocities, including change of direction without increased pain or instability to return to age appropriate and community activities at prior level of function. Status: met   2. Patient will squat with neutral lower limb alignment, without early heel lift and with efficient eccentric control in order to squat to the floor to  items safely/  Status: met   3. Patient to demonstrate at least 7 degrees of bilateral dorsiflexion active range of motion in order to improve gait mechanics and reduce risk of injury.  Status: partially met  Left: 7 degrees   Right: 0 degrees       Therapy procedure time and total treatment time can be found documented on the Time Entry flowsheet     Statement Selected

## 2023-10-16 ENCOUNTER — APPOINTMENT (OUTPATIENT)
Dept: MAMMOGRAPHY | Facility: IMAGING CENTER | Age: 38
End: 2023-10-16
Payer: COMMERCIAL

## 2023-10-16 ENCOUNTER — OUTPATIENT (OUTPATIENT)
Dept: OUTPATIENT SERVICES | Facility: HOSPITAL | Age: 38
LOS: 1 days | End: 2023-10-16
Payer: COMMERCIAL

## 2023-10-16 DIAGNOSIS — Z00.8 ENCOUNTER FOR OTHER GENERAL EXAMINATION: ICD-10-CM

## 2023-10-16 DIAGNOSIS — Z98.890 OTHER SPECIFIED POSTPROCEDURAL STATES: Chronic | ICD-10-CM

## 2023-10-16 PROCEDURE — G0279: CPT | Mod: 26

## 2023-10-16 PROCEDURE — 77065 DX MAMMO INCL CAD UNI: CPT | Mod: 26,LT

## 2023-10-16 PROCEDURE — 77065 DX MAMMO INCL CAD UNI: CPT

## 2023-10-16 PROCEDURE — 76642 ULTRASOUND BREAST LIMITED: CPT

## 2023-10-16 PROCEDURE — G0279: CPT

## 2023-10-16 PROCEDURE — 76642 ULTRASOUND BREAST LIMITED: CPT | Mod: 26,LT

## 2024-04-06 ENCOUNTER — OUTPATIENT (OUTPATIENT)
Dept: OUTPATIENT SERVICES | Facility: HOSPITAL | Age: 39
LOS: 1 days | End: 2024-04-06
Payer: COMMERCIAL

## 2024-04-06 ENCOUNTER — APPOINTMENT (OUTPATIENT)
Dept: MRI IMAGING | Facility: IMAGING CENTER | Age: 39
End: 2024-04-06
Payer: COMMERCIAL

## 2024-04-06 DIAGNOSIS — Z98.890 OTHER SPECIFIED POSTPROCEDURAL STATES: Chronic | ICD-10-CM

## 2024-04-06 DIAGNOSIS — Z00.8 ENCOUNTER FOR OTHER GENERAL EXAMINATION: ICD-10-CM

## 2024-04-06 DIAGNOSIS — Z12.39 ENCOUNTER FOR OTHER SCREENING FOR MALIGNANT NEOPLASM OF BREAST: ICD-10-CM

## 2024-04-06 PROCEDURE — C8908: CPT

## 2024-04-06 PROCEDURE — C8937: CPT

## 2024-04-06 PROCEDURE — A9585: CPT

## 2024-04-06 PROCEDURE — 77049 MRI BREAST C-+ W/CAD BI: CPT | Mod: 26

## 2024-04-11 ENCOUNTER — OUTPATIENT (OUTPATIENT)
Dept: OUTPATIENT SERVICES | Facility: HOSPITAL | Age: 39
LOS: 1 days | End: 2024-04-11
Payer: COMMERCIAL

## 2024-04-11 ENCOUNTER — APPOINTMENT (OUTPATIENT)
Dept: MRI IMAGING | Facility: IMAGING CENTER | Age: 39
End: 2024-04-11
Payer: COMMERCIAL

## 2024-04-11 DIAGNOSIS — Z00.8 ENCOUNTER FOR OTHER GENERAL EXAMINATION: ICD-10-CM

## 2024-04-11 DIAGNOSIS — Z98.890 OTHER SPECIFIED POSTPROCEDURAL STATES: Chronic | ICD-10-CM

## 2024-04-11 PROCEDURE — 19085 BX BREAST 1ST LESION MR IMAG: CPT | Mod: LT

## 2024-04-11 PROCEDURE — 88305 TISSUE EXAM BY PATHOLOGIST: CPT | Mod: 26

## 2024-04-11 PROCEDURE — 77065 DX MAMMO INCL CAD UNI: CPT

## 2024-04-11 PROCEDURE — 19085 BX BREAST 1ST LESION MR IMAG: CPT

## 2024-04-11 PROCEDURE — 88305 TISSUE EXAM BY PATHOLOGIST: CPT

## 2024-04-11 PROCEDURE — A4648: CPT

## 2024-04-11 PROCEDURE — 77065 DX MAMMO INCL CAD UNI: CPT | Mod: 26,LT

## 2024-04-11 PROCEDURE — A9585: CPT

## 2024-04-18 ENCOUNTER — APPOINTMENT (OUTPATIENT)
Dept: MAMMOGRAPHY | Facility: IMAGING CENTER | Age: 39
End: 2024-04-18

## 2024-04-18 LAB — SURGICAL PATHOLOGY STUDY: SIGNIFICANT CHANGE UP

## 2024-10-25 ENCOUNTER — OUTPATIENT (OUTPATIENT)
Dept: OUTPATIENT SERVICES | Facility: HOSPITAL | Age: 39
LOS: 1 days | End: 2024-10-25
Payer: COMMERCIAL

## 2024-10-25 ENCOUNTER — APPOINTMENT (OUTPATIENT)
Dept: MRI IMAGING | Facility: IMAGING CENTER | Age: 39
End: 2024-10-25
Payer: COMMERCIAL

## 2024-10-25 DIAGNOSIS — Z98.890 OTHER SPECIFIED POSTPROCEDURAL STATES: Chronic | ICD-10-CM

## 2024-10-25 DIAGNOSIS — Z00.8 ENCOUNTER FOR OTHER GENERAL EXAMINATION: ICD-10-CM

## 2024-10-25 DIAGNOSIS — N63.0 UNSPECIFIED LUMP IN UNSPECIFIED BREAST: ICD-10-CM

## 2024-10-25 PROCEDURE — C8908: CPT

## 2024-10-25 PROCEDURE — A9585: CPT

## 2024-10-25 PROCEDURE — C8937: CPT

## 2024-10-25 PROCEDURE — 77049 MRI BREAST C-+ W/CAD BI: CPT | Mod: 26

## 2024-11-26 ENCOUNTER — EMERGENCY (EMERGENCY)
Facility: HOSPITAL | Age: 39
LOS: 1 days | Discharge: HOME | End: 2024-11-26
Attending: STUDENT IN AN ORGANIZED HEALTH CARE EDUCATION/TRAINING PROGRAM | Admitting: STUDENT IN AN ORGANIZED HEALTH CARE EDUCATION/TRAINING PROGRAM
Payer: COMMERCIAL

## 2024-11-26 VITALS
OXYGEN SATURATION: 99 % | HEIGHT: 64 IN | DIASTOLIC BLOOD PRESSURE: 87 MMHG | TEMPERATURE: 98 F | SYSTOLIC BLOOD PRESSURE: 127 MMHG | HEART RATE: 65 BPM | WEIGHT: 160.06 LBS | RESPIRATION RATE: 16 BRPM

## 2024-11-26 DIAGNOSIS — Z98.890 OTHER SPECIFIED POSTPROCEDURAL STATES: Chronic | ICD-10-CM

## 2024-11-26 PROCEDURE — 99283 EMERGENCY DEPT VISIT LOW MDM: CPT

## 2024-11-26 PROCEDURE — 99282 EMERGENCY DEPT VISIT SF MDM: CPT

## 2024-11-26 RX ORDER — OXYMETAZOLINE HCL 0.05 %
2 SPRAY, NON-AEROSOL (ML) NASAL ONCE
Refills: 0 | Status: COMPLETED | OUTPATIENT
Start: 2024-11-26 | End: 2024-11-26

## 2024-11-26 RX ADMIN — Medication 2 SPRAY(S): at 20:44

## 2024-11-26 NOTE — ED PROVIDER NOTE - NS ED ROS FT
positive: epistaxis  negative: f/c/hemoptysis/cp/sob/abdominal pain/n/v/d/dysuria/hematuria/leg edema/rash

## 2024-11-26 NOTE — ED PROVIDER NOTE - PHYSICAL EXAMINATION
GENERAL:  Awake, alert and in NAD, non-toxic appearing  ENMT: Airway patent; no blood in oropharynx. uvula midline. bilat nares with no active bleeding, no clots, no polyps, no fb, no trauma  EYES: conjunctiva clear  CARDIAC: Regular rate, regular rhythm.  Heart sounds S1, S2, no S3, S4. No murmurs, rubs or gallops.  RESPIRATORY: Breath sounds clear and equal in bilateral anterior lung fields, no wheezes/ronchi/crackles/stridor; pt breathing and speaking comfortably with no increased WOB, no accessory mm. use, no intercostal retractions, no nasal flaring  GI: abdomen soft, non-distended, non-tender, no rebound or guarding.  SKIN: warm and dry, no rashes  PSYCH: awake, alert, calm and cooperative  EXTREMITIES: no ble edema  NEURO: gcs 15

## 2024-11-26 NOTE — ED PROVIDER NOTE - CLINICAL SUMMARY MEDICAL DECISION MAKING FREE TEXT BOX
39F no PMH/PSH p/w 1 day hx of bilat nare epistaxis with clots that started this morning. On exam, bp 127/87 VS otherwise wnl, no active epistaxis exam unremarkable.    ddx: likely anterior epistaxis as resolved spontaneously not concerned for posterior epistaxis    Plan:  - will give afrin for home told pt to return to ed for recurrent epistaxis told pt to f/u with ent and pulmonology to investigate cause of epistaxis  - will dc

## 2024-11-26 NOTE — ED PROVIDER NOTE - OBJECTIVE STATEMENT
39F no PMH/PSH p/w 1 day hx of bilat nare epistaxis with clots that started this morning. Pt had two episodes, one this morning that lasted 1.5 hours and one that lasted 2 hours. both resolved spontaneously. no hx of similar sx. pt recently had a 6 week hx of cough that has resolved. no bloodthinner use.

## 2024-11-26 NOTE — ED ADULT TRIAGE NOTE - CHIEF COMPLAINT QUOTE
Pt presents to ED C/O two episodes of epistaxis today with clots. Sent from  for further eval. Pt denies lightheadedness. Ambulating well. Denies blood thinners. States, " I was sick for about a month I had bronchitis." Pt protecting own airway. Pt presents to ED C/O two episodes of epistaxis today with clots. Sent from  for further eval. Pt bleeding stopped about 45 min prior to coming to ED as per pt. Pt Ambulating well. Denies blood thinners, denies lightheadedness. States, " I was sick for about a month I had bronchitis." Pt protecting own airway.

## 2024-11-26 NOTE — ED ADULT NURSE NOTE - CHIEF COMPLAINT QUOTE
Pt presents to ED C/O two episodes of epistaxis today with clots. Sent from  for further eval. Pt bleeding stopped about 45 min prior to coming to ED as per pt. Pt Ambulating well. Denies blood thinners, denies lightheadedness. States, " I was sick for about a month I had bronchitis." Pt protecting own airway.

## 2024-11-26 NOTE — ED PROVIDER NOTE - PATIENT PORTAL LINK FT
You can access the FollowMyHealth Patient Portal offered by [facility] by registering at the following website: [email or web address]. By joining [employer]’s FollowMyHealth portal, you will also be able to view your health information using other applications (apps) compatible with our system.

## 2024-11-26 NOTE — ED ADULT NURSE NOTE - OBJECTIVE STATEMENT
Pt is a 40y/o F denies PMHx presenting to the ED w/ c/o of 2 episodes of epistaxis today with clots, since resolved prior to arrival to ED. Denies blood thinner use, increased swallowing, SOB, diff breathing, dizziness/lightheadedness upon assessment. Pt w/ no active bleeding. Pt A/Ox3, speaking in clear/complete sentences. Respirations easy/even and unlabored on RA. Pt ambulates independently w/ steady gait. Pt resting comfortably in chair, no acute distress. Pending ED provider eval.

## 2024-11-28 DIAGNOSIS — R04.0 EPISTAXIS: ICD-10-CM

## 2024-11-28 PROBLEM — Z78.9 OTHER SPECIFIED HEALTH STATUS: Chronic | Status: ACTIVE | Noted: 2024-11-26

## 2024-12-02 ENCOUNTER — APPOINTMENT (OUTPATIENT)
Dept: OTOLARYNGOLOGY | Facility: CLINIC | Age: 39
End: 2024-12-02
Payer: COMMERCIAL

## 2024-12-02 ENCOUNTER — NON-APPOINTMENT (OUTPATIENT)
Age: 39
End: 2024-12-02

## 2024-12-02 DIAGNOSIS — Z78.9 OTHER SPECIFIED HEALTH STATUS: ICD-10-CM

## 2024-12-02 DIAGNOSIS — Z86.2 PERSONAL HISTORY OF DISEASES OF THE BLOOD AND BLOOD-FORMING ORGANS AND CERTAIN DISORDERS INVOLVING THE IMMUNE MECHANISM: ICD-10-CM

## 2024-12-02 DIAGNOSIS — R04.0 EPISTAXIS: ICD-10-CM

## 2024-12-02 DIAGNOSIS — R09.82 POSTNASAL DRIP: ICD-10-CM

## 2024-12-02 DIAGNOSIS — Z87.09 PERSONAL HISTORY OF OTHER DISEASES OF THE RESPIRATORY SYSTEM: ICD-10-CM

## 2024-12-02 PROCEDURE — 99204 OFFICE O/P NEW MOD 45 MIN: CPT | Mod: 25

## 2024-12-02 PROCEDURE — 31231 NASAL ENDOSCOPY DX: CPT

## 2024-12-06 ENCOUNTER — APPOINTMENT (OUTPATIENT)
Dept: OBGYN | Facility: CLINIC | Age: 39
End: 2024-12-06

## 2025-02-25 NOTE — ASU PREOP CHECKLIST - DNR CLARIFICATION FORM COMPLETED
I ordered starting dose for ozempic x 1 month. Used comparable dosage chart from UPDATE. Starter dose 0.25 mg weekly selected.   Should reduce lantus by 50% to avoid lows.  Last A1C showed tight control at 5.4% 12/23/2024    Keke Grubbs, APRN, CNP     n/a

## 2025-04-21 ENCOUNTER — APPOINTMENT (OUTPATIENT)
Dept: OBGYN | Facility: CLINIC | Age: 40
End: 2025-04-21
Payer: COMMERCIAL

## 2025-04-21 PROCEDURE — 99386 PREV VISIT NEW AGE 40-64: CPT

## 2025-04-21 PROCEDURE — 96127 BRIEF EMOTIONAL/BEHAV ASSMT: CPT

## 2025-04-21 PROCEDURE — 99459 PELVIC EXAMINATION: CPT

## 2025-04-29 ENCOUNTER — APPOINTMENT (OUTPATIENT)
Dept: MAMMOGRAPHY | Facility: CLINIC | Age: 40
End: 2025-04-29
Payer: COMMERCIAL

## 2025-04-29 ENCOUNTER — APPOINTMENT (OUTPATIENT)
Dept: ULTRASOUND IMAGING | Facility: CLINIC | Age: 40
End: 2025-04-29
Payer: COMMERCIAL

## 2025-04-29 PROCEDURE — 76641 ULTRASOUND BREAST COMPLETE: CPT | Mod: 50

## 2025-04-29 PROCEDURE — 77066 DX MAMMO INCL CAD BI: CPT

## 2025-04-29 PROCEDURE — G0279: CPT
